# Patient Record
Sex: FEMALE | Race: WHITE | Employment: FULL TIME | URBAN - METROPOLITAN AREA
[De-identification: names, ages, dates, MRNs, and addresses within clinical notes are randomized per-mention and may not be internally consistent; named-entity substitution may affect disease eponyms.]

---

## 2019-03-01 ENCOUNTER — HOSPITAL ENCOUNTER (OUTPATIENT)
Dept: MAMMOGRAPHY | Age: 49
Discharge: HOME | End: 2019-03-01
Payer: COMMERCIAL

## 2019-03-01 DIAGNOSIS — Z12.39 SCREENING BREAST EXAMINATION: ICD-10-CM

## 2019-03-01 PROCEDURE — 77067 SCR MAMMO BI INCL CAD: CPT

## 2019-03-12 ENCOUNTER — HOSPITAL ENCOUNTER (OUTPATIENT)
Dept: MAMMOGRAPHY | Age: 49
Discharge: HOME | End: 2019-03-12
Payer: COMMERCIAL

## 2019-03-12 ENCOUNTER — HOSPITAL ENCOUNTER (OUTPATIENT)
Dept: ULTRASOUND IMAGING | Age: 49
Discharge: HOME | End: 2019-03-12
Payer: COMMERCIAL

## 2019-03-12 DIAGNOSIS — R92.8 ABNORMAL MAMMOGRAPHY: ICD-10-CM

## 2019-03-12 PROCEDURE — 77065 DX MAMMO INCL CAD UNI: CPT

## 2019-03-12 PROCEDURE — 76642 ULTRASOUND BREAST LIMITED: CPT

## 2019-03-13 ENCOUNTER — HOSPITAL ENCOUNTER (OUTPATIENT)
Dept: MAMMOGRAPHY | Age: 49
Discharge: HOME | End: 2019-03-13
Payer: COMMERCIAL

## 2019-03-13 ENCOUNTER — HOSPITAL ENCOUNTER (OUTPATIENT)
Dept: ULTRASOUND IMAGING | Age: 49
Discharge: HOME | End: 2019-03-13
Payer: COMMERCIAL

## 2019-03-13 VITALS
WEIGHT: 190 LBS | HEIGHT: 68 IN | BODY MASS INDEX: 28.79 KG/M2 | RESPIRATION RATE: 20 BRPM | SYSTOLIC BLOOD PRESSURE: 153 MMHG | HEART RATE: 60 BPM | TEMPERATURE: 98.5 F | OXYGEN SATURATION: 93 % | DIASTOLIC BLOOD PRESSURE: 91 MMHG

## 2019-03-13 DIAGNOSIS — R92.8 ABNORMAL MAMMOGRAM OF LEFT BREAST: ICD-10-CM

## 2019-03-13 DIAGNOSIS — N63.0 BREAST MASS: ICD-10-CM

## 2019-03-13 PROCEDURE — 19083 BX BREAST 1ST LESION US IMAG: CPT

## 2019-03-13 PROCEDURE — 88305 TISSUE EXAM BY PATHOLOGIST: CPT

## 2019-03-13 PROCEDURE — 77065 DX MAMMO INCL CAD UNI: CPT

## 2019-03-13 NOTE — PROGRESS NOTES
1140 - pt ambulated to [facility]. For post left breast biopsy mammogram.        1142 - left breast biopsy sample to lab via nurse.

## 2019-03-13 NOTE — PROGRESS NOTES
Pt ambulated to U/S without difficulty accomp by nurse - pt ambulated to bathroom to void prior to positioning on stretcher.

## 2019-03-13 NOTE — PROGRESS NOTES
Pt ambulated to stereo room without difficulty accomp. By nurse. Pt awake, alert and oriented x 3.    Pt states tenderness of left lateral breast.

## 2019-03-14 ENCOUNTER — TELEPHONE (OUTPATIENT)
Dept: MAMMOGRAPHY | Age: 49
End: 2019-03-14

## 2019-03-14 NOTE — TELEPHONE ENCOUNTER
Dr. Liliane York reviewed pathology report and recommended yearly screening mammogram follow up. Ms. Arsenio Mathews was notified of benign results and recommendation follow up. Dr. Cale Akers was advised faxed a copy of the pathology report with follow up recomendations.   [employer] updated-letter sent